# Patient Record
Sex: FEMALE | Race: BLACK OR AFRICAN AMERICAN | NOT HISPANIC OR LATINO | Employment: STUDENT | URBAN - METROPOLITAN AREA
[De-identification: names, ages, dates, MRNs, and addresses within clinical notes are randomized per-mention and may not be internally consistent; named-entity substitution may affect disease eponyms.]

---

## 2023-01-13 ENCOUNTER — HOSPITAL ENCOUNTER (EMERGENCY)
Facility: HOSPITAL | Age: 18
Discharge: HOME | End: 2023-01-13
Attending: EMERGENCY MEDICINE

## 2023-01-13 VITALS
RESPIRATION RATE: 18 BRPM | OXYGEN SATURATION: 99 % | HEIGHT: 64 IN | TEMPERATURE: 98.2 F | BODY MASS INDEX: 31.31 KG/M2 | DIASTOLIC BLOOD PRESSURE: 68 MMHG | HEART RATE: 79 BPM | SYSTOLIC BLOOD PRESSURE: 139 MMHG

## 2023-01-13 DIAGNOSIS — M54.50 CHRONIC LOW BACK PAIN WITHOUT SCIATICA, UNSPECIFIED BACK PAIN LATERALITY: Primary | ICD-10-CM

## 2023-01-13 DIAGNOSIS — G89.29 CHRONIC LOW BACK PAIN WITHOUT SCIATICA, UNSPECIFIED BACK PAIN LATERALITY: Primary | ICD-10-CM

## 2023-01-13 LAB
BACTERIA UR QL AUTO: ABNORMAL /HPF
BILIRUB UR QL STRIP: NEGATIVE
CLARITY UR: ABNORMAL
COLOR UR: ABNORMAL
EXT PREGNANCY TEST URINE: NEGATIVE
EXT. CONTROL: NORMAL
GLUCOSE UR STRIP-MCNC: NEGATIVE MG/DL
HGB UR QL STRIP.AUTO: ABNORMAL
KETONES UR STRIP-MCNC: NEGATIVE MG/DL
LEUKOCYTE ESTERASE UR QL STRIP: NEGATIVE
NITRITE UR QL STRIP: NEGATIVE
NON-SQ EPI CELLS URNS QL MICRO: ABNORMAL /HPF
PH UR STRIP.AUTO: 6 [PH]
PROT UR STRIP-MCNC: NEGATIVE MG/DL
RBC #/AREA URNS AUTO: ABNORMAL /HPF
SP GR UR STRIP.AUTO: 1.02 (ref 1–1.03)
UROBILINOGEN UR STRIP-ACNC: <2 MG/DL
WBC #/AREA URNS AUTO: ABNORMAL /HPF

## 2023-01-13 RX ORDER — ACETAMINOPHEN 325 MG/1
975 TABLET ORAL ONCE
Status: COMPLETED | OUTPATIENT
Start: 2023-01-13 | End: 2023-01-13

## 2023-01-13 RX ORDER — IBUPROFEN 600 MG/1
600 TABLET ORAL ONCE
Status: COMPLETED | OUTPATIENT
Start: 2023-01-13 | End: 2023-01-13

## 2023-01-13 RX ORDER — LIDOCAINE 50 MG/G
1 PATCH TOPICAL ONCE
Status: DISCONTINUED | OUTPATIENT
Start: 2023-01-13 | End: 2023-01-13 | Stop reason: HOSPADM

## 2023-01-13 RX ADMIN — ACETAMINOPHEN 975 MG: 325 TABLET, FILM COATED ORAL at 08:30

## 2023-01-13 RX ADMIN — LIDOCAINE 1 PATCH: 50 PATCH TOPICAL at 08:31

## 2023-01-13 RX ADMIN — IBUPROFEN 600 MG: 600 TABLET, FILM COATED ORAL at 08:30

## 2023-01-13 NOTE — ED PROVIDER NOTES
History  Chief Complaint   Patient presents with   • Back Pain     Pt c/o lower back pain that has been going on for a "year or two " Pt states pain was worse today  Denies urinary symptoms/hx of kidney stones  Patient is a 57-year-old female presenting with lower back pain  Patient states been going on for a year to now, came in today because pain got worse last night  Describes the pain as a sharp pain in the lower back, does not radiate down her legs, denies loss of bowel or bladder control, made worse by cracking her back  Patient denies recent illness, fever, chills, nausea, vomiting, abdominal pain, dysuria, hematuria, vaginal pain or discharge, diarrhea, constipation  None       No past medical history on file  No past surgical history on file  No family history on file  I have reviewed and agree with the history as documented  E-Cigarette/Vaping   • E-Cigarette Use Never User      E-Cigarette/Vaping Substances     Social History     Tobacco Use   • Smoking status: Never   • Smokeless tobacco: Never   Vaping Use   • Vaping Use: Never used   Substance Use Topics   • Alcohol use: Never   • Drug use: Never        Review of Systems   Constitutional: Negative for chills and fever  HENT: Negative for ear pain and sore throat  Eyes: Negative for pain and visual disturbance  Respiratory: Negative for cough and shortness of breath  Cardiovascular: Negative for chest pain and palpitations  Gastrointestinal: Negative for abdominal pain and vomiting  Genitourinary: Negative for dysuria and hematuria  Musculoskeletal: Negative for arthralgias and back pain  Skin: Negative for color change and rash  Neurological: Negative for seizures and syncope  All other systems reviewed and are negative        Physical Exam  ED Triage Vitals [01/13/23 0745]   Temperature Pulse Respirations Blood Pressure SpO2   98 2 °F (36 8 °C) 79 18 139/68 99 %      Temp Source Heart Rate Source Patient Position - Orthostatic VS BP Location FiO2 (%)   Oral Monitor Sitting Left arm --      Pain Score       9             Orthostatic Vital Signs  Vitals:    01/13/23 0745   BP: 139/68   Pulse: 79   Patient Position - Orthostatic VS: Sitting       Physical Exam  Vitals and nursing note reviewed  Constitutional:       General: She is not in acute distress  Appearance: She is well-developed  HENT:      Head: Normocephalic and atraumatic  Nose: Nose normal  No congestion  Mouth/Throat:      Mouth: Mucous membranes are moist       Pharynx: Oropharynx is clear  Eyes:      Extraocular Movements: Extraocular movements intact  Conjunctiva/sclera: Conjunctivae normal       Pupils: Pupils are equal, round, and reactive to light  Cardiovascular:      Rate and Rhythm: Normal rate and regular rhythm  Pulses: Normal pulses  Heart sounds: Normal heart sounds  No murmur heard  Pulmonary:      Effort: Pulmonary effort is normal  No respiratory distress  Breath sounds: Normal breath sounds  Chest:      Chest wall: No tenderness  Abdominal:      General: Abdomen is flat  Bowel sounds are normal       Palpations: Abdomen is soft  Tenderness: There is no abdominal tenderness  There is no right CVA tenderness or left CVA tenderness  Musculoskeletal:         General: No tenderness, deformity or signs of injury  Normal range of motion  Cervical back: Normal range of motion and neck supple  No rigidity or tenderness  Skin:     General: Skin is warm and dry  Findings: No bruising, lesion or rash  Neurological:      General: No focal deficit present  Mental Status: She is alert and oriented to person, place, and time  Cranial Nerves: No cranial nerve deficit  Sensory: No sensory deficit           ED Medications  Medications   lidocaine (LIDODERM) 5 % patch 1 patch (1 patch Topical Medication Applied 1/13/23 0846)   ibuprofen (MOTRIN) tablet 600 mg (600 mg Oral Given 1/13/23 0830)   acetaminophen (TYLENOL) tablet 975 mg (975 mg Oral Given 1/13/23 0830)       Diagnostic Studies  Results Reviewed     Procedure Component Value Units Date/Time    Urine Microscopic [[id number]]  (Abnormal) Collected: 01/13/23 0832    Lab Status: Final result Specimen: Urine, Clean Catch Updated: 01/13/23 0843     RBC, UA 1-2 /hpf      WBC, UA 1-2 /hpf      Epithelial Cells Moderate /hpf      Bacteria, UA None Seen /hpf     UA (URINE) with reflex to Scope [[id number]]  (Abnormal) Collected: 01/13/23 0832    Lab Status: Final result Specimen: Urine, Clean Catch Updated: 01/13/23 0842     Color, UA Light Yellow     Clarity, UA Turbid     Specific [place], UA 1 020     pH, UA 6 0     Leukocytes, UA Negative     Nitrite, UA Negative     Protein, UA Negative mg/dl      Glucose, UA Negative mg/dl      Ketones, UA Negative mg/dl      Urobilinogen, UA <2 0 mg/dl      Bilirubin, UA Negative     Occult Blood, UA Trace    POCT pregnancy, urine [[id number]]  (Normal) Resulted: 01/13/23 0834    Lab Status: Final result Updated: 01/13/23 0834     EXT Preg Test, Ur Negative     Control Valid                 No orders to display         Procedures  Procedures      ED Course  ED Course as of 01/13/23 0914 Fri Jan 13, 2023   0913 PREGNANCY TEST URINE: Negative   0913 UA (URINE) with reflex to Scope(!)  Unremarkable   0913 Urine Microscopic(!)  Unremarkable                                       Medical Decision Making  Patient is a 73-year-old female presenting with lower back pain  Physical exam unremarkable, negative for spinal/paraspinal tenderness, no signs of trauma  No red flag symptoms like weakness, numbness, tingling, saddle anesthesia, incontinence of bowel or bladder  Patient given Tylenol, ibuprofen, Lidoderm patch  U/A-unremarkable, Upreg negative    Recommend patient follow-up with for reassessment and management      Patient agreeable and appropriate for discharge    Chronic low back pain without sciatica, unspecified back pain laterality: acute illness or injury  Amount and/or Complexity of Data Reviewed  Labs: ordered  Decision-making details documented in ED Course  Risk  OTC drugs  Prescription drug management  Disposition  Final diagnoses:   Chronic low back pain without sciatica, unspecified back pain laterality     Time reflects when diagnosis was documented in both MDM as applicable and the Disposition within this note     Time User Action Codes Description Comment    1/13/2023  9:05 AM Paige Jade Devang [M54 50,  G89 29] Chronic low back pain without sciatica, unspecified back pain laterality       ED Disposition     ED Disposition   Discharge    Condition   Stable    Date/Time   Fri Jan 13, 2023  9:04 AM    Comment   Jose A Raymundo discharge to home/self care  Follow-up Information     Follow up With Specialties Details Why Contact Info Additional [address]   [address] [id number]  [address] , [place], [place] [facility] [place], [place], [address]          Patient's Medications    No medications on file     No discharge procedures on file  PDMP Review     None           ED Provider  Attending physically available and evaluated Jose A Raymundo I managed the patient along with the ED Attending      Electronically Signed by         Eddie Esposito MD  01/13/23 6454

## 2023-01-13 NOTE — DISCHARGE INSTRUCTIONS
Please make an appointment with family doctor for reassessment and management of symptoms  Thank you for allowing us to take part in your care

## 2023-01-13 NOTE — ED ATTENDING ATTESTATION
1/13/2023  ISinai DO, saw and evaluated the patient  I have discussed the patient with the resident/non-physician practitioner and agree with the resident's/non-physician practitioner's findings, Plan of Care, and MDM as documented in the resident's/non-physician practitioner's note, except where noted  All available labs and Radiology studies were reviewed  I was present for key portions of any procedure(s) performed by the resident/non-physician practitioner and I was immediately available to provide assistance  At this point I agree with the current assessment done in the Emergency Department  I have conducted an independent evaluation of this patient a history and physical is as follows:    Patient is a 68-year-old female with no past medical history who presents with lower back pain  Patient states that she has had lower back pain for at least 1 year  She states that it is always present but worsened last evening  She denies any exacerbating factors  States that it feels better when she "cracks it"  It will occasionally radiate down her legs until she cracks her back  She denies any numbness, tingling, weakness, bowel or bladder dysfunction  She has not taken anything for the pain  She does not have a family physician and has never been evaluated for this pain  He denies any recent trauma  Upon questioning, she also admits to cervical neck pain  On exam, patient is in no acute distress  She is able to sit up unassisted and without distress  No midline tenderness in the cervical region  She does have tenderness in the bilateral paraspinal musculature  Midline tenderness in the mid to lower lumbar spine  Motor, sensation normal     Given history and physical, suspect musculoskeletal etiology  Neurovascularly intact  Do not suspect AAA, aortic dissection, renal pathology, perforated viscus   Low suspicion for cauda equina or epidural compression syndrome as there is no bowel or bladder dysfunction, bilateral symptoms or saddle paresthesias  No red flag features such as trauma, fever, immunocompromised state, history of malignancy  Do not feel that emergent imaging indicated at this time  Patient advised to follow up with PCP and return to ED if symptoms progress  Portions of the above record have been created with voice recognition software  Occasional wrong word or "sound alike" substitutions may have occurred due to the inherent limitations of voice recognition software  Read the chart carefully and recognize, using context, where substitutions may have occurred        ED Course         Critical Care Time  Procedures

## 2023-01-13 NOTE — Clinical Note
Marileelexx Kelsey was seen and treated in our emergency department on 1/13/2023  Diagnosis: Back Pain    Megan Salmeron  may return to school on return date  She may return on this date: 01/16/2023         If you have any questions or concerns, please don't hesitate to call        Vickie Calvin MD    ______________________________           _______________          _______________  Hospital Representative                              Date                                Time

## 2023-04-26 ENCOUNTER — TELEPHONE (OUTPATIENT)
Dept: DERMATOLOGY | Facility: CLINIC | Age: 18
End: 2023-04-26

## 2023-04-26 NOTE — TELEPHONE ENCOUNTER
Pt left voice message to schedule NP appt  Returned call and left a message to call the office to make an appt

## 2023-05-15 ENCOUNTER — COSMETIC (OUTPATIENT)
Dept: PLASTIC SURGERY | Facility: CLINIC | Age: 18
End: 2023-05-15

## 2023-05-15 VITALS
BODY MASS INDEX: 30.05 KG/M2 | WEIGHT: 176 LBS | HEIGHT: 64 IN | SYSTOLIC BLOOD PRESSURE: 115 MMHG | DIASTOLIC BLOOD PRESSURE: 75 MMHG

## 2023-05-15 DIAGNOSIS — Z41.1 ENCOUNTER FOR COSMETIC SURGERY: Primary | ICD-10-CM

## 2023-05-15 NOTE — PROGRESS NOTES
"Assessment and Plan:  Ms Bubba Willams is a 21 y o  female presenting with macromastia    We discussed the procedure, risks, benefits, alternatives and postoperative instructions and expectations  I explained her N/IMF distance does increase the possibility of requiring FNG  All patient's questions were answered and she voiced understanding  Booking form was submitted  She reports this will be a cosmetic procedure  History of Present Illness:   Ms Bubba Willams is a 21 y o  female presenting with macromastia  She states her weight fluctuates within a few pounds for years  Menarche at 13  Her breasts have always been large and dense but grew significantly in the last few years  She does note fluctuations around the time of her hormonal fluctuations  She is a non-nicotine user  She is unsure as to a h/o breast cancer in her family  The last bra size she was aware of was a G cup and her ideal would be a C cup  Review of Systems:  A 12 point ROS was performed and negative except per HPI  Past Medical History:  History reviewed  No pertinent past medical history  Past Surgical History:  History reviewed  No pertinent surgical history  Social History:  Social History     Tobacco Use   • Smoking status: Never   • Smokeless tobacco: Never   Vaping Use   • Vaping Use: Never used   Substance Use Topics   • Alcohol use: Never   • Drug use: Never       Family History:  History reviewed  No pertinent family history  Allergies:  No Known Allergies    Medications:  No current outpatient medications on file prior to visit  No current facility-administered medications on file prior to visit  Physical Examination:  /75   Ht 5' 4\" (1 626 m)   Wt 79 8 kg (176 lb)   BMI 30 21 kg/m²   Estimated body mass index is 30 21 kg/m² as calculated from the following:    Height as of this encounter: 5' 4\" (1 626 m)  Weight as of this encounter: 79 8 kg (176 lb)    General: NAD, well appearing, " AAOx3  HEENT: NCAT, EOMI, MMM, supple  Resp: Nonlabored  Heart: RRR  Abdomen: Soft, ND, NT  Extremities/MSK: no LE edema, no obvious deficits in ROM  Neuro: grossly intact with no obvious deficits  Skin: no obvious lesions or rashes  Breast: no palpable mass, no palpable axillary lymphadenopathy, grade III ptosis, N/IMF 19cm      Nina Alcala,   Plastic and Reconstructive Surgery

## 2024-04-23 ENCOUNTER — TELEPHONE (OUTPATIENT)
Dept: FAMILY MEDICINE CLINIC | Facility: CLINIC | Age: 19
End: 2024-04-23

## 2024-04-23 ENCOUNTER — TELEPHONE (OUTPATIENT)
Age: 19
End: 2024-04-23